# Patient Record
Sex: FEMALE | Race: WHITE | URBAN - METROPOLITAN AREA
[De-identification: names, ages, dates, MRNs, and addresses within clinical notes are randomized per-mention and may not be internally consistent; named-entity substitution may affect disease eponyms.]

---

## 2020-10-21 ENCOUNTER — NEW PATIENT (OUTPATIENT)
Dept: URBAN - METROPOLITAN AREA CLINIC 43 | Facility: CLINIC | Age: 55
End: 2020-10-21
Payer: COMMERCIAL

## 2020-10-21 DIAGNOSIS — G43.511 PERSISTENT MIGRAINE AURA WITHOUT CEREBRAL INFARCTION, INTRACTABLE, WITH STATUS MIGRAINOSUS: Primary | ICD-10-CM

## 2020-10-21 PROCEDURE — 92004 COMPRE OPH EXAM NEW PT 1/>: CPT | Performed by: OPTOMETRIST

## 2020-10-21 ASSESSMENT — INTRAOCULAR PRESSURE
OS: 17
OD: 16

## 2020-11-13 ENCOUNTER — FOLLOW UP ESTABLISHED (OUTPATIENT)
Dept: URBAN - METROPOLITAN AREA CLINIC 43 | Facility: CLINIC | Age: 55
End: 2020-11-13
Payer: COMMERCIAL

## 2020-11-13 DIAGNOSIS — H52.4 PRESBYOPIA: Primary | ICD-10-CM

## 2020-11-13 PROCEDURE — 92012 INTRM OPH EXAM EST PATIENT: CPT | Performed by: OPTOMETRIST

## 2020-11-13 PROCEDURE — 92015 DETERMINE REFRACTIVE STATE: CPT | Performed by: OPTOMETRIST

## 2020-11-13 ASSESSMENT — VISUAL ACUITY
OD: 20/20
OS: 20/20

## 2020-11-13 ASSESSMENT — KERATOMETRY
OD: 47.25
OS: 46.88

## 2020-11-13 ASSESSMENT — INTRAOCULAR PRESSURE
OD: 14
OS: 13

## 2022-04-05 ENCOUNTER — OFFICE VISIT (OUTPATIENT)
Dept: URBAN - METROPOLITAN AREA CLINIC 44 | Facility: CLINIC | Age: 57
End: 2022-04-05
Payer: COMMERCIAL

## 2022-04-05 DIAGNOSIS — H04.123 DRY EYE SYNDROME OF BILATERAL LACRIMAL GLANDS: Primary | ICD-10-CM

## 2022-04-05 PROCEDURE — 92014 COMPRE OPH EXAM EST PT 1/>: CPT | Performed by: OPTOMETRIST

## 2022-04-05 ASSESSMENT — KERATOMETRY
OD: 47.13
OS: 46.88

## 2022-04-05 ASSESSMENT — INTRAOCULAR PRESSURE
OS: 24
OD: 24

## 2022-04-05 ASSESSMENT — VISUAL ACUITY
OD: 20/20
OS: 20/20

## 2022-12-06 ENCOUNTER — OFFICE VISIT (OUTPATIENT)
Facility: LOCATION | Age: 57
End: 2022-12-06
Payer: COMMERCIAL

## 2022-12-06 DIAGNOSIS — H52.4 PRESBYOPIA: Primary | ICD-10-CM

## 2022-12-06 DIAGNOSIS — H25.13 AGE-RELATED NUCLEAR CATARACT, BILATERAL: ICD-10-CM

## 2022-12-06 DIAGNOSIS — H04.123 DRY EYE SYNDROME OF BILATERAL LACRIMAL GLANDS: ICD-10-CM

## 2022-12-06 PROCEDURE — 92014 COMPRE OPH EXAM EST PT 1/>: CPT | Performed by: OPTOMETRIST

## 2022-12-06 ASSESSMENT — VISUAL ACUITY
OD: 20/20
OS: 20/20

## 2022-12-06 ASSESSMENT — KERATOMETRY
OD: 47.63
OS: 47.13

## 2022-12-06 ASSESSMENT — INTRAOCULAR PRESSURE
OD: 22
OS: 22

## 2022-12-06 NOTE — IMPRESSION/PLAN
Impression: Dry eye syndrome of bilateral lacrimal glands: H04.123. Plan: Dry eyes account for the patient's complaints. There is no evidence of permanent changes to the cornea. Explained condition does not have a cure and will need artificial tears for maintenance. Patient instructed to use artificial tears QID. Patient to call back if symptoms do not improve in 4-6weeks or symptoms worsen to discuss further intervention.

## 2022-12-13 ENCOUNTER — OFFICE VISIT (OUTPATIENT)
Facility: LOCATION | Age: 57
End: 2022-12-13
Payer: COMMERCIAL

## 2022-12-13 DIAGNOSIS — D31.31 BENIGN NEOPLASM OF RIGHT CHOROID: Primary | ICD-10-CM

## 2022-12-13 DIAGNOSIS — H25.13 AGE-RELATED NUCLEAR CATARACT, BILATERAL: ICD-10-CM

## 2022-12-13 DIAGNOSIS — H04.123 TEAR FILM INSUFFICIENCY OF BILATERAL LACRIMAL GLANDS: ICD-10-CM

## 2022-12-13 PROCEDURE — 99214 OFFICE O/P EST MOD 30 MIN: CPT | Performed by: OPTOMETRIST

## 2022-12-13 ASSESSMENT — INTRAOCULAR PRESSURE
OS: 19
OD: 19

## 2023-12-27 ENCOUNTER — OFFICE VISIT (OUTPATIENT)
Facility: LOCATION | Age: 58
End: 2023-12-27
Payer: COMMERCIAL

## 2023-12-27 DIAGNOSIS — H04.123 TEAR FILM INSUFFICIENCY OF BILATERAL LACRIMAL GLANDS: ICD-10-CM

## 2023-12-27 DIAGNOSIS — H35.361 DRUSEN (DEGENERATIVE) OF MACULA, RIGHT EYE: ICD-10-CM

## 2023-12-27 DIAGNOSIS — D31.31 BENIGN NEOPLASM OF RIGHT CHOROID: ICD-10-CM

## 2023-12-27 DIAGNOSIS — H25.13 AGE-RELATED NUCLEAR CATARACT, BILATERAL: Primary | ICD-10-CM

## 2023-12-27 PROCEDURE — 92014 COMPRE OPH EXAM EST PT 1/>: CPT | Performed by: OPTOMETRIST

## 2023-12-27 ASSESSMENT — VISUAL ACUITY
OS: 20/20
OD: 20/20

## 2023-12-27 ASSESSMENT — INTRAOCULAR PRESSURE
OS: 18
OD: 20

## 2024-10-29 ENCOUNTER — OFFICE VISIT (OUTPATIENT)
Facility: LOCATION | Age: 59
End: 2024-10-29
Payer: COMMERCIAL

## 2024-10-29 DIAGNOSIS — H02.052 TRICHIASIS WITHOUT ENTROPION RIGHT LOWER EYELID: ICD-10-CM

## 2024-10-29 DIAGNOSIS — D31.31 BENIGN NEOPLASM OF RIGHT CHOROID: ICD-10-CM

## 2024-10-29 DIAGNOSIS — H04.123 TEAR FILM INSUFFICIENCY OF BILATERAL LACRIMAL GLANDS: ICD-10-CM

## 2024-10-29 DIAGNOSIS — H25.13 AGE-RELATED NUCLEAR CATARACT, BILATERAL: Primary | ICD-10-CM

## 2024-10-29 DIAGNOSIS — H35.361 DRUSEN (DEGENERATIVE) OF MACULA, RIGHT EYE: ICD-10-CM

## 2024-10-29 PROCEDURE — 92014 COMPRE OPH EXAM EST PT 1/>: CPT | Performed by: OPTOMETRIST

## 2024-10-29 PROCEDURE — 67820 REVISE EYELASHES: CPT | Performed by: OPTOMETRIST

## 2024-10-29 RX ORDER — FLUOROMETHOLONE 1 MG/ML
0.1 % SOLUTION/ DROPS OPHTHALMIC
Qty: 5 | Refills: 0 | Status: ACTIVE
Start: 2024-10-29

## 2024-10-29 ASSESSMENT — INTRAOCULAR PRESSURE
OD: 20
OS: 21

## 2024-10-29 ASSESSMENT — VISUAL ACUITY
OS: 20/20
OD: 20/20

## 2024-10-29 ASSESSMENT — KERATOMETRY
OS: 46.88
OD: 47.25

## 2024-11-12 ENCOUNTER — OFFICE VISIT (OUTPATIENT)
Facility: LOCATION | Age: 59
End: 2024-11-12
Payer: COMMERCIAL

## 2024-11-12 DIAGNOSIS — H16.223 KERATOCONJUNCTIVITIS SICCA, BILATERAL: ICD-10-CM

## 2024-11-12 DIAGNOSIS — H04.123 TEAR FILM INSUFFICIENCY OF BILATERAL LACRIMAL GLANDS: Primary | ICD-10-CM

## 2024-11-12 PROCEDURE — 99213 OFFICE O/P EST LOW 20 MIN: CPT | Performed by: OPTOMETRIST

## 2024-11-12 RX ORDER — CYCLOSPORINE 0.5 MG/ML
0.05 % EMULSION OPHTHALMIC
Qty: 60 | Refills: 2 | Status: ACTIVE
Start: 2024-11-12

## 2024-11-12 ASSESSMENT — INTRAOCULAR PRESSURE
OS: 18
OD: 18

## 2025-01-16 ENCOUNTER — OFFICE VISIT (OUTPATIENT)
Facility: LOCATION | Age: 60
End: 2025-01-16
Payer: COMMERCIAL

## 2025-01-16 DIAGNOSIS — H52.4 PRESBYOPIA: Primary | ICD-10-CM

## 2025-01-16 PROCEDURE — 92014 COMPRE OPH EXAM EST PT 1/>: CPT | Performed by: OPTOMETRIST

## 2025-01-16 ASSESSMENT — INTRAOCULAR PRESSURE
OS: 19
OD: 18

## 2025-01-16 ASSESSMENT — VISUAL ACUITY
OD: 20/20
OS: 20/20

## 2025-01-16 ASSESSMENT — KERATOMETRY
OD: 47.00
OS: 46.50